# Patient Record
Sex: MALE | Race: WHITE
[De-identification: names, ages, dates, MRNs, and addresses within clinical notes are randomized per-mention and may not be internally consistent; named-entity substitution may affect disease eponyms.]

---

## 2018-02-10 ENCOUNTER — HOSPITAL ENCOUNTER (EMERGENCY)
Dept: HOSPITAL 91 - FTE | Age: 33
Discharge: HOME | End: 2018-02-10
Payer: MEDICAID

## 2018-02-10 ENCOUNTER — HOSPITAL ENCOUNTER (EMERGENCY)
Age: 33
Discharge: HOME | End: 2018-02-10

## 2018-02-10 DIAGNOSIS — G89.18: Primary | ICD-10-CM

## 2018-02-10 DIAGNOSIS — R10.32: ICD-10-CM

## 2018-02-10 PROCEDURE — 99284 EMERGENCY DEPT VISIT MOD MDM: CPT

## 2018-02-10 PROCEDURE — 76536 US EXAM OF HEAD AND NECK: CPT

## 2019-04-04 ENCOUNTER — HOSPITAL ENCOUNTER (EMERGENCY)
Dept: HOSPITAL 10 - FTE | Age: 34
Discharge: HOME | End: 2019-04-04
Payer: MEDICAID

## 2019-04-04 ENCOUNTER — HOSPITAL ENCOUNTER (EMERGENCY)
Dept: HOSPITAL 91 - FTE | Age: 34
Discharge: HOME | End: 2019-04-04
Payer: MEDICAID

## 2019-04-04 VITALS — RESPIRATION RATE: 20 BRPM | SYSTOLIC BLOOD PRESSURE: 123 MMHG | DIASTOLIC BLOOD PRESSURE: 75 MMHG | HEART RATE: 75 BPM

## 2019-04-04 VITALS
HEIGHT: 66 IN | WEIGHT: 163.58 LBS | WEIGHT: 163.58 LBS | HEIGHT: 66 IN | BODY MASS INDEX: 26.29 KG/M2 | BODY MASS INDEX: 26.29 KG/M2
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
## 2019-04-04 DIAGNOSIS — S39.92XA: Primary | ICD-10-CM
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
## 2019-04-04 PROCEDURE — 99284 EMERGENCY DEPT VISIT MOD MDM: CPT

## 2019-04-04 PROCEDURE — 96372 THER/PROPH/DIAG INJ SC/IM: CPT

## 2019-04-04 RX ADMIN — KETOROLAC TROMETHAMINE 1 MG: 30 INJECTION, SOLUTION INTRAMUSCULAR at 08:18

## 2019-04-04 NOTE — ERD
ER Documentation


Chief Complaint


Chief Complaint





back pain after bending down to  a tool this morning around 0640





HPI


34-year-old male presents with low back pain after bending over this morning to 


 a tool.  Denies any bowel or bladder incontinence, fevers, weakness, 


deficits.  Denies previous history of back problems.  Patient has an additional 


concern that he has pain in the area of the left inguinal hernia repair 2 years 


ago.  He denies any recurrent swelling, fevers, vomiting, urinary complaints.  


He is requesting an MRI to evaluate why he still has pain in the area of hernia 


repair.





ROS


All systems reviewed and are negative except as per history of present illness.





Medications


Home Meds


Active Scripts


Tramadol HCl (Tramadol HCl) 50 Mg Tablet, 50 MG PO Q4 PRN for PAIN, #20 TAB


   Prov:ESVIN BLUNT MD         4/4/19


Ibuprofen* (Motrin*) 600 Mg Tab, 600 MG PO Q6, #20 TAB


   Prov:ESVIN BLUNT MD         4/4/19


Ibuprofen* (Motrin*) 400 Mg Tab, 400 MG PO Q8 for 5 Days, #15 TAB


   Prov:ASHLI FERNANDEZ MD         2/10/18





Allergies


Allergies:  


Coded Allergies:  


     No Known Drug Allergies (Verified  Allergy, 12/13/12)





PMhx/Soc


Medical and Surgical Hx:  pt denies Medical Hx


History of Surgery:  Yes (Bilateral INGUINAL HERNIA REPAIR)


Anesthesia Reaction:  No


Hx Neurological Disorder:  No


Hx Respiratory Disorders:  No


Hx Cardiac Disorders:  No


Hx Psychiatric Problems:  No


Hx Miscellaneous Medical Probl:  No


Hx Alcohol Use:  No


Hx Substance Use:  No


Hx Tobacco Use:  No


Smoking Status:  Never smoker





FmHx


Family History:  No diabetes, No coronary disease, No other





Physical Exam


Vitals





Vital Signs


  Date      Temp  Pulse  Resp  B/P (MAP)   Pulse Ox  O2          O2 Flow    FiO2


Time                                                 Delivery    Rate


    4/4/19  97.6     75    20      123/75       100


     07:55                           (91)





Physical Exam


Const:   No acute distress


Head:   Atraumatic 


Eyes:    Normal Conjunctiva


ENT:    Normal External Ears, Nose and Mouth.


Neck:               Full range of motion. No meningismus.


Resp:   Clear to auscultation bilaterally


Cardio:   Regular rate and rhythm, no murmurs


Abd:    Soft, non tender, non distended. Normal bowel sounds healed left hernia 


inguinal scar.  No erythema, recurrent hernia, testicles nontender normal size 


and descended bilaterally.  No rebound.


Skin:   No petechiae or rashes


Back:   No midline or flank tenderness.  Tenderness left L4-5 paraspinous area. 


No midline tenderness or deformities.  Negative straight leg raise.


Ext:    No cyanosis, or edema


Neur:   Awake and alert


Psych:    Normal Mood and Affect


Results 24 hrs





Current Medications


 Medications
   Dose
          Sig/Parker
       Start Time
   Status  Last


 (Trade)       Ordered        Route
 PRN     Stop Time              Admin
Dose


                              Reason                                Admin


 Ketorolac
     60 mg          ONCE  STAT
    4/4/19        DC            4/4/19


Tromethamine
                 IM
            08:14
 4/4/19                08:18



 (Toradol)                                   08:15








Procedures/MDM


Patient presents with low back pain after bending over this morning.  He has no 


signs of cauda equina syndrome, deficits, flank pain, bacterial infection, 


genitourinary etiology, additional concerning symptoms.  He likely has some 


muscular skeletal strain.  His pain in the area of previous left inguinal hernia


repair with essentially normal exam.  He may have neuropathic pain or pain 


related to scar tissue.  He has no signs of obstruction, signs of surgical 


abdomen.  We will treat with Toradol here, ibuprofen, tramadol, instructions for


back exercises, return precautions and primary care follow-up.  The patient was 


stable with no new complaints during the ER course. Clinically, there is no 


current evidence to suggest meningitis, sepsis, acute abdomen, pneumonia, 


stroke,  acute coronary syndrome, pulmonary embolism, aortic dissection or any 


other emergent condition appearing to require further evaluation or 


hospitalization.  Patient counseled regarding my diagnostic impression and care 


plan. Prior to discharge all questions answered. Pt agrees with treatment plan 


and understands strict return precautions. Pt is instructed to follow up with 


primary care provider within 24-48 hours. Precautionary instructions provided 


including instructions to return to the ER if not improving or for any worsening


or changing symptoms or concerns.





Departure


Diagnosis:  


   Primary Impression:  


   Injury of back


   Encounter type:  initial encounter  Qualified Codes:  S39.92XA - Unspecified 


   injury of lower back, initial encounter


Condition:  Stable


Patient Instructions:  Back Sprain/Strain, Post Op Wound Check, Pain


Referrals:  


NO PRIMARY,CARE PHYSICIAN (PCP)





Additional Instructions:  


 Cheque otro vez con mejía doctor primario en el proximo gandara or regresa para mas o


nueva simptomas.











ESVIN BLUNT MD              Apr 4, 2019 08:34